# Patient Record
Sex: FEMALE | Race: WHITE | NOT HISPANIC OR LATINO
[De-identification: names, ages, dates, MRNs, and addresses within clinical notes are randomized per-mention and may not be internally consistent; named-entity substitution may affect disease eponyms.]

---

## 2021-03-16 PROBLEM — Z00.00 ENCOUNTER FOR PREVENTIVE HEALTH EXAMINATION: Status: ACTIVE | Noted: 2021-03-16

## 2021-04-08 ENCOUNTER — APPOINTMENT (OUTPATIENT)
Dept: OBGYN | Facility: CLINIC | Age: 31
End: 2021-04-08

## 2021-10-24 ENCOUNTER — TRANSCRIPTION ENCOUNTER (OUTPATIENT)
Age: 31
End: 2021-10-24

## 2021-12-09 ENCOUNTER — ASOB RESULT (OUTPATIENT)
Age: 31
End: 2021-12-09

## 2021-12-09 ENCOUNTER — APPOINTMENT (OUTPATIENT)
Dept: ANTEPARTUM | Facility: CLINIC | Age: 31
End: 2021-12-09
Payer: COMMERCIAL

## 2021-12-09 ENCOUNTER — TRANSCRIPTION ENCOUNTER (OUTPATIENT)
Age: 31
End: 2021-12-09

## 2021-12-09 PROCEDURE — 76813 OB US NUCHAL MEAS 1 GEST: CPT

## 2021-12-09 PROCEDURE — 76801 OB US < 14 WKS SINGLE FETUS: CPT

## 2021-12-09 PROCEDURE — 36416 COLLJ CAPILLARY BLOOD SPEC: CPT

## 2021-12-28 ENCOUNTER — TRANSCRIPTION ENCOUNTER (OUTPATIENT)
Age: 31
End: 2021-12-28

## 2022-02-03 ENCOUNTER — APPOINTMENT (OUTPATIENT)
Dept: ANTEPARTUM | Facility: CLINIC | Age: 32
End: 2022-02-03

## 2022-02-05 ENCOUNTER — APPOINTMENT (OUTPATIENT)
Dept: ANTEPARTUM | Facility: CLINIC | Age: 32
End: 2022-02-05
Payer: COMMERCIAL

## 2022-02-05 ENCOUNTER — ASOB RESULT (OUTPATIENT)
Age: 32
End: 2022-02-05

## 2022-02-05 PROCEDURE — 76811 OB US DETAILED SNGL FETUS: CPT

## 2022-05-31 ENCOUNTER — INPATIENT (INPATIENT)
Facility: HOSPITAL | Age: 32
LOS: 5 days | Discharge: HOME | End: 2022-06-06
Attending: OBSTETRICS & GYNECOLOGY | Admitting: OBSTETRICS & GYNECOLOGY
Payer: COMMERCIAL

## 2022-05-31 VITALS
DIASTOLIC BLOOD PRESSURE: 80 MMHG | SYSTOLIC BLOOD PRESSURE: 137 MMHG | HEART RATE: 115 BPM | RESPIRATION RATE: 20 BRPM | TEMPERATURE: 99 F

## 2022-05-31 DIAGNOSIS — O10.919 UNSPECIFIED PRE-EXISTING HYPERTENSION COMPLICATING PREGNANCY, UNSPECIFIED TRIMESTER: ICD-10-CM

## 2022-05-31 DIAGNOSIS — Z3A.00 WEEKS OF GESTATION OF PREGNANCY NOT SPECIFIED: ICD-10-CM

## 2022-05-31 DIAGNOSIS — O26.899 OTHER SPECIFIED PREGNANCY RELATED CONDITIONS, UNSPECIFIED TRIMESTER: ICD-10-CM

## 2022-05-31 DIAGNOSIS — Z90.89 ACQUIRED ABSENCE OF OTHER ORGANS: Chronic | ICD-10-CM

## 2022-05-31 LAB
ALBUMIN SERPL ELPH-MCNC: 3.5 G/DL — SIGNIFICANT CHANGE UP (ref 3.3–5)
ALP SERPL-CCNC: 120 U/L — SIGNIFICANT CHANGE UP (ref 40–120)
ALT FLD-CCNC: 26 U/L — SIGNIFICANT CHANGE UP (ref 4–33)
ANION GAP SERPL CALC-SCNC: 15 MMOL/L — HIGH (ref 7–14)
APPEARANCE UR: CLEAR — SIGNIFICANT CHANGE UP
APTT BLD: 28.2 SEC — SIGNIFICANT CHANGE UP (ref 27–36.3)
AST SERPL-CCNC: 31 U/L — SIGNIFICANT CHANGE UP (ref 4–32)
BACTERIA # UR AUTO: ABNORMAL
BASOPHILS # BLD AUTO: 0.05 K/UL — SIGNIFICANT CHANGE UP (ref 0–0.2)
BASOPHILS NFR BLD AUTO: 0.3 % — SIGNIFICANT CHANGE UP (ref 0–2)
BILIRUB SERPL-MCNC: 0.2 MG/DL — SIGNIFICANT CHANGE UP (ref 0.2–1.2)
BILIRUB UR-MCNC: NEGATIVE — SIGNIFICANT CHANGE UP
BLD GP AB SCN SERPL QL: POSITIVE — SIGNIFICANT CHANGE UP
BUN SERPL-MCNC: 9 MG/DL — SIGNIFICANT CHANGE UP (ref 7–23)
CALCIUM SERPL-MCNC: 9.6 MG/DL — SIGNIFICANT CHANGE UP (ref 8.4–10.5)
CHLORIDE SERPL-SCNC: 102 MMOL/L — SIGNIFICANT CHANGE UP (ref 98–107)
CO2 SERPL-SCNC: 19 MMOL/L — LOW (ref 22–31)
COLOR SPEC: YELLOW — SIGNIFICANT CHANGE UP
CREAT ?TM UR-MCNC: 161 MG/DL — SIGNIFICANT CHANGE UP
CREAT SERPL-MCNC: 0.57 MG/DL — SIGNIFICANT CHANGE UP (ref 0.5–1.3)
DIFF PNL FLD: NEGATIVE — SIGNIFICANT CHANGE UP
EGFR: 125 ML/MIN/1.73M2 — SIGNIFICANT CHANGE UP
EOSINOPHIL # BLD AUTO: 0.07 K/UL — SIGNIFICANT CHANGE UP (ref 0–0.5)
EOSINOPHIL NFR BLD AUTO: 0.5 % — SIGNIFICANT CHANGE UP (ref 0–6)
EPI CELLS # UR: 3 /HPF — SIGNIFICANT CHANGE UP (ref 0–5)
FIBRINOGEN PPP-MCNC: 783 MG/DL — HIGH (ref 330–520)
GLUCOSE SERPL-MCNC: 80 MG/DL — SIGNIFICANT CHANGE UP (ref 70–99)
GLUCOSE UR QL: NEGATIVE — SIGNIFICANT CHANGE UP
HCT VFR BLD CALC: 37.9 % — SIGNIFICANT CHANGE UP (ref 34.5–45)
HGB BLD-MCNC: 12.7 G/DL — SIGNIFICANT CHANGE UP (ref 11.5–15.5)
HYALINE CASTS # UR AUTO: 0 /LPF — SIGNIFICANT CHANGE UP (ref 0–7)
IANC: 10.69 K/UL — HIGH (ref 1.8–7.4)
IMM GRANULOCYTES NFR BLD AUTO: 0.5 % — SIGNIFICANT CHANGE UP (ref 0–1.5)
INR BLD: 0.94 RATIO — SIGNIFICANT CHANGE UP (ref 0.88–1.16)
KETONES UR-MCNC: ABNORMAL
LDH SERPL L TO P-CCNC: 211 U/L — SIGNIFICANT CHANGE UP (ref 135–225)
LEUKOCYTE ESTERASE UR-ACNC: NEGATIVE — SIGNIFICANT CHANGE UP
LYMPHOCYTES # BLD AUTO: 20.7 % — SIGNIFICANT CHANGE UP (ref 13–44)
LYMPHOCYTES # BLD AUTO: 3.11 K/UL — SIGNIFICANT CHANGE UP (ref 1–3.3)
MCHC RBC-ENTMCNC: 30.2 PG — SIGNIFICANT CHANGE UP (ref 27–34)
MCHC RBC-ENTMCNC: 33.5 GM/DL — SIGNIFICANT CHANGE UP (ref 32–36)
MCV RBC AUTO: 90 FL — SIGNIFICANT CHANGE UP (ref 80–100)
MONOCYTES # BLD AUTO: 1.03 K/UL — HIGH (ref 0–0.9)
MONOCYTES NFR BLD AUTO: 6.9 % — SIGNIFICANT CHANGE UP (ref 2–14)
NEUTROPHILS # BLD AUTO: 10.69 K/UL — HIGH (ref 1.8–7.4)
NEUTROPHILS NFR BLD AUTO: 71.1 % — SIGNIFICANT CHANGE UP (ref 43–77)
NITRITE UR-MCNC: NEGATIVE — SIGNIFICANT CHANGE UP
NRBC # BLD: 0 /100 WBCS — SIGNIFICANT CHANGE UP
NRBC # FLD: 0 K/UL — SIGNIFICANT CHANGE UP
PH UR: 6.5 — SIGNIFICANT CHANGE UP (ref 5–8)
PLATELET # BLD AUTO: 280 K/UL — SIGNIFICANT CHANGE UP (ref 150–400)
POTASSIUM SERPL-MCNC: 4.3 MMOL/L — SIGNIFICANT CHANGE UP (ref 3.5–5.3)
POTASSIUM SERPL-SCNC: 4.3 MMOL/L — SIGNIFICANT CHANGE UP (ref 3.5–5.3)
PROT ?TM UR-MCNC: 28 MG/DL — SIGNIFICANT CHANGE UP
PROT SERPL-MCNC: 6.7 G/DL — SIGNIFICANT CHANGE UP (ref 6–8.3)
PROT UR-MCNC: ABNORMAL
PROT/CREAT UR-RTO: 0.2 RATIO — SIGNIFICANT CHANGE UP (ref 0–0.2)
PROTHROM AB SERPL-ACNC: 10.9 SEC — SIGNIFICANT CHANGE UP (ref 10.5–13.4)
RBC # BLD: 4.21 M/UL — SIGNIFICANT CHANGE UP (ref 3.8–5.2)
RBC # FLD: 13.2 % — SIGNIFICANT CHANGE UP (ref 10.3–14.5)
RBC CASTS # UR COMP ASSIST: 4 /HPF — SIGNIFICANT CHANGE UP (ref 0–4)
RH IG SCN BLD-IMP: NEGATIVE — SIGNIFICANT CHANGE UP
RH IG SCN BLD-IMP: NEGATIVE — SIGNIFICANT CHANGE UP
SODIUM SERPL-SCNC: 136 MMOL/L — SIGNIFICANT CHANGE UP (ref 135–145)
SP GR SPEC: 1.02 — SIGNIFICANT CHANGE UP (ref 1–1.05)
URATE SERPL-MCNC: 5.7 MG/DL — SIGNIFICANT CHANGE UP (ref 2.5–7)
UROBILINOGEN FLD QL: SIGNIFICANT CHANGE UP
WBC # BLD: 15.03 K/UL — HIGH (ref 3.8–10.5)
WBC # FLD AUTO: 15.03 K/UL — HIGH (ref 3.8–10.5)
WBC UR QL: 4 /HPF — SIGNIFICANT CHANGE UP (ref 0–5)

## 2022-05-31 PROCEDURE — 86077 PHYS BLOOD BANK SERV XMATCH: CPT

## 2022-05-31 RX ORDER — SODIUM CHLORIDE 9 MG/ML
1000 INJECTION, SOLUTION INTRAVENOUS
Refills: 0 | Status: DISCONTINUED | OUTPATIENT
Start: 2022-05-31 | End: 2022-06-04

## 2022-05-31 RX ORDER — SODIUM CHLORIDE 9 MG/ML
1000 INJECTION, SOLUTION INTRAVENOUS ONCE
Refills: 0 | Status: COMPLETED | OUTPATIENT
Start: 2022-05-31 | End: 2022-05-31

## 2022-05-31 RX ORDER — OXYTOCIN 10 UNIT/ML
333.33 VIAL (ML) INJECTION
Qty: 20 | Refills: 0 | Status: COMPLETED | OUTPATIENT
Start: 2022-05-31 | End: 2022-06-01

## 2022-05-31 RX ORDER — SODIUM CHLORIDE 9 MG/ML
1000 INJECTION, SOLUTION INTRAVENOUS
Refills: 0 | Status: DISCONTINUED | OUTPATIENT
Start: 2022-05-31 | End: 2022-05-31

## 2022-05-31 RX ORDER — OXYTOCIN 10 UNIT/ML
333.33 VIAL (ML) INJECTION
Qty: 20 | Refills: 0 | Status: DISCONTINUED | OUTPATIENT
Start: 2022-05-31 | End: 2022-05-31

## 2022-05-31 RX ADMIN — SODIUM CHLORIDE 1000 MILLILITER(S): 9 INJECTION, SOLUTION INTRAVENOUS at 21:55

## 2022-05-31 RX ADMIN — SODIUM CHLORIDE 125 MILLILITER(S): 9 INJECTION, SOLUTION INTRAVENOUS at 21:50

## 2022-05-31 NOTE — OB RN TRIAGE NOTE - NSICDXPASTMEDICALHX_GEN_ALL_CORE_FT
PAST MEDICAL HISTORY:  No pertinent past medical history PAST MEDICAL HISTORY:  2019 novel coronavirus disease (COVID-19) 5/12/2022

## 2022-05-31 NOTE — OB PROVIDER H&P - NS_OBGYNHISTORY_OBGYN_ALL_OB_FT
AP course complicated by labile bp, rH negative received rhogam at 28 weeks   OB: SAB x1 @9 wk 2021  GYN: denies    GBS negative 5/7

## 2022-05-31 NOTE — OB PROVIDER H&P - NSHPPHYSICALEXAM_GEN_ALL_CORE
Vital Signs Last 24 Hrs  T(C): 37.2 (31 May 2022 18:07), Max: 37.2 (31 May 2022 18:07)  T(F): 99 (31 May 2022 18:07), Max: 99 (31 May 2022 18:07)  HR: 106 (31 May 2022 20:48) (105 - 115)  BP: 124/73 (31 May 2022 20:48) (124/73 - 137/80)  BP(mean): --  RR: 20 (31 May 2022 18:07) (20 - 20)  SpO2: --.    Assessment reveals VSS  Abdomen soft, NT, gravid  Cat 1 tracing, ctx every 4 mins  Transabdominal Ultrasound- cephalic  Vaginal Exam- 0/long/-3   A&Ox3  Lungs- clear bilateral  Heart- normal rate and rhythm

## 2022-05-31 NOTE — OB RN TRIAGE NOTE - FALL HARM RISK - UNIVERSAL INTERVENTIONS
Bed in lowest position, wheels locked, appropriate side rails in place/Call bell, personal items and telephone in reach/Instruct patient to call for assistance before getting out of bed or chair/Non-slip footwear when patient is out of bed/[place] to call system/Physically safe environment - no spills, clutter or unnecessary equipment/Purposeful Proactive Rounding/Room/bathroom lighting operational, light cord in reach

## 2022-05-31 NOTE — OB PROVIDER TRIAGE NOTE - NSOBPROVIDERNOTE_OBGYN_ALL_OB_FT
HEL labs sent results pend.   NST in progress   will continue to monitor HEL labs sent results pend.   NST in progress   will continue to monitor    Labs reviewed and plan D/w Dr. Dubon  patient to be induced for gHTN

## 2022-05-31 NOTE — OB RN PATIENT PROFILE - FALL HARM RISK - UNIVERSAL INTERVENTIONS
Bed in lowest position, wheels locked, appropriate side rails in place/Call bell, personal items and telephone in reach/Instruct patient to call for assistance before getting out of bed or chair/Non-slip footwear when patient is out of bed/[place] to call system/Physically safe environment - no spills, clutter or unnecessary equipment/Purposeful Proactive Rounding/Room/bathroom lighting operational, light cord in reach

## 2022-05-31 NOTE — OB PROVIDER TRIAGE NOTE - NS_OBGYNHISTORY_OBGYN_ALL_OB_FT
AP course complicated by labile bp   OB: SAB x1 @9 wk 2021  GYN: loriies AP course complicated by labile bp   OB: SAB x1 @9 wk 2021  GYN: denies    GBS negative

## 2022-05-31 NOTE — OB RN PATIENT PROFILE - NSICDXNOPASTMEDICALHX_GEN_ALL_CORE
<-- Click to add NO pertinent Past Medical History Complex Repair And Graft Additional Text (Will Appearing After The Standard Complex Repair Text): The complex repair was not sufficient to completely close the primary defect. The remaining additional defect was repaired with the graft mentioned below.

## 2022-05-31 NOTE — OB PROVIDER H&P - PROBLEM SELECTOR PLAN 1
plan d/w Dr. Dubon  patient to be admitted for IOL for gestational hypertension   for po cytotec   epi prn   routine orders   covid pcr not done due to patient being positive 3 weeks ago   for IV bolus 1000 mL of LR   approved for one regular diet prior to starting IOL.  consents signed by patient

## 2022-05-31 NOTE — OB PROVIDER TRIAGE NOTE - HISTORY OF PRESENT ILLNESS
32 year old  at 37 weeks presents with elevated BP in office. Patient states she had a 24 hour urine sent and HELLP labs sent last monday and . Denies any headaches, blurry vision or epigastric pain. Denies contractions or cramping, denies LOF, stateS +FM.   PMH: denies   PSH: Tonsillectomy   Hx: Anxiety- takes Propanalol 5mg PRN   Denies use of etoh, drugs, tobacco

## 2022-05-31 NOTE — OB PROVIDER TRIAGE NOTE - NSHPPHYSICALEXAM_GEN_ALL_CORE
Vital Signs Last 24 Hrs  T(C): 37.2 (31 May 2022 18:07), Max: 37.2 (31 May 2022 18:07)  T(F): 99 (31 May 2022 18:07), Max: 99 (31 May 2022 18:07)  HR: 106 (31 May 2022 20:48) (105 - 115)  BP: 124/73 (31 May 2022 20:48) (124/73 - 137/80)  BP(mean): --  RR: 20 (31 May 2022 18:07) (20 - 20)  SpO2: --.    Assessment reveals VSS  Abdomen soft, NT, gravid  Cat 1 tracing, ctx every 4 mins  Transabdominal Ultrasound-   Vaginal Exam-   A&Ox3  Lungs- clear bilateral  Heart- normal rate and rhythm Vital Signs Last 24 Hrs  T(C): 37.2 (31 May 2022 18:07), Max: 37.2 (31 May 2022 18:07)  T(F): 99 (31 May 2022 18:07), Max: 99 (31 May 2022 18:07)  HR: 106 (31 May 2022 20:48) (105 - 115)  BP: 124/73 (31 May 2022 20:48) (124/73 - 137/80)  BP(mean): --  RR: 20 (31 May 2022 18:07) (20 - 20)  SpO2: --.    Assessment reveals VSS  Abdomen soft, NT, gravid  Cat 1 tracing, ctx every 4 mins  Transabdominal Ultrasound- cephalic  Vaginal Exam- 0/long/-3   A&Ox3  Lungs- clear bilateral  Heart- normal rate and rhythm

## 2022-06-01 DIAGNOSIS — O16.9 UNSPECIFIED MATERNAL HYPERTENSION, UNSPECIFIED TRIMESTER: ICD-10-CM

## 2022-06-01 LAB
COVID-19 SPIKE DOMAIN AB INTERP: POSITIVE
COVID-19 SPIKE DOMAIN ANTIBODY RESULT: >250 U/ML — HIGH
SARS-COV-2 IGG+IGM SERPL QL IA: >250 U/ML — HIGH
SARS-COV-2 IGG+IGM SERPL QL IA: POSITIVE
T PALLIDUM AB TITR SER: NEGATIVE — SIGNIFICANT CHANGE UP

## 2022-06-01 RX ADMIN — SODIUM CHLORIDE 125 MILLILITER(S): 9 INJECTION, SOLUTION INTRAVENOUS at 22:45

## 2022-06-02 LAB
ALBUMIN SERPL ELPH-MCNC: 3.6 G/DL — SIGNIFICANT CHANGE UP (ref 3.3–5)
ALP SERPL-CCNC: 123 U/L — HIGH (ref 40–120)
ALT FLD-CCNC: 22 U/L — SIGNIFICANT CHANGE UP (ref 4–33)
ANION GAP SERPL CALC-SCNC: 13 MMOL/L — SIGNIFICANT CHANGE UP (ref 7–14)
APTT BLD: 28.2 SEC — SIGNIFICANT CHANGE UP (ref 27–36.3)
AST SERPL-CCNC: 23 U/L — SIGNIFICANT CHANGE UP (ref 4–32)
BASOPHILS # BLD AUTO: 0.03 K/UL — SIGNIFICANT CHANGE UP (ref 0–0.2)
BASOPHILS NFR BLD AUTO: 0.2 % — SIGNIFICANT CHANGE UP (ref 0–2)
BILIRUB SERPL-MCNC: 0.3 MG/DL — SIGNIFICANT CHANGE UP (ref 0.2–1.2)
BUN SERPL-MCNC: 7 MG/DL — SIGNIFICANT CHANGE UP (ref 7–23)
CALCIUM SERPL-MCNC: 9.5 MG/DL — SIGNIFICANT CHANGE UP (ref 8.4–10.5)
CHLORIDE SERPL-SCNC: 101 MMOL/L — SIGNIFICANT CHANGE UP (ref 98–107)
CO2 SERPL-SCNC: 21 MMOL/L — LOW (ref 22–31)
CREAT SERPL-MCNC: 0.58 MG/DL — SIGNIFICANT CHANGE UP (ref 0.5–1.3)
EGFR: 124 ML/MIN/1.73M2 — SIGNIFICANT CHANGE UP
EOSINOPHIL # BLD AUTO: 0.04 K/UL — SIGNIFICANT CHANGE UP (ref 0–0.5)
EOSINOPHIL NFR BLD AUTO: 0.3 % — SIGNIFICANT CHANGE UP (ref 0–6)
FIBRINOGEN PPP-MCNC: 846 MG/DL — HIGH (ref 330–520)
GLUCOSE SERPL-MCNC: 81 MG/DL — SIGNIFICANT CHANGE UP (ref 70–99)
HCT VFR BLD CALC: 37.1 % — SIGNIFICANT CHANGE UP (ref 34.5–45)
HGB BLD-MCNC: 12.4 G/DL — SIGNIFICANT CHANGE UP (ref 11.5–15.5)
IANC: 9.29 K/UL — HIGH (ref 1.8–7.4)
IMM GRANULOCYTES NFR BLD AUTO: 0.5 % — SIGNIFICANT CHANGE UP (ref 0–1.5)
INR BLD: 0.94 RATIO — SIGNIFICANT CHANGE UP (ref 0.88–1.16)
LDH SERPL L TO P-CCNC: 178 U/L — SIGNIFICANT CHANGE UP (ref 135–225)
LYMPHOCYTES # BLD AUTO: 1.88 K/UL — SIGNIFICANT CHANGE UP (ref 1–3.3)
LYMPHOCYTES # BLD AUTO: 15.5 % — SIGNIFICANT CHANGE UP (ref 13–44)
MCHC RBC-ENTMCNC: 30.1 PG — SIGNIFICANT CHANGE UP (ref 27–34)
MCHC RBC-ENTMCNC: 33.4 GM/DL — SIGNIFICANT CHANGE UP (ref 32–36)
MCV RBC AUTO: 90 FL — SIGNIFICANT CHANGE UP (ref 80–100)
MONOCYTES # BLD AUTO: 0.81 K/UL — SIGNIFICANT CHANGE UP (ref 0–0.9)
MONOCYTES NFR BLD AUTO: 6.7 % — SIGNIFICANT CHANGE UP (ref 2–14)
NEUTROPHILS # BLD AUTO: 9.29 K/UL — HIGH (ref 1.8–7.4)
NEUTROPHILS NFR BLD AUTO: 76.8 % — SIGNIFICANT CHANGE UP (ref 43–77)
NRBC # BLD: 0 /100 WBCS — SIGNIFICANT CHANGE UP
NRBC # FLD: 0 K/UL — SIGNIFICANT CHANGE UP
PLATELET # BLD AUTO: 285 K/UL — SIGNIFICANT CHANGE UP (ref 150–400)
POTASSIUM SERPL-MCNC: 4 MMOL/L — SIGNIFICANT CHANGE UP (ref 3.5–5.3)
POTASSIUM SERPL-SCNC: 4 MMOL/L — SIGNIFICANT CHANGE UP (ref 3.5–5.3)
PROT SERPL-MCNC: 6.7 G/DL — SIGNIFICANT CHANGE UP (ref 6–8.3)
PROTHROM AB SERPL-ACNC: 10.9 SEC — SIGNIFICANT CHANGE UP (ref 10.5–13.4)
RBC # BLD: 4.12 M/UL — SIGNIFICANT CHANGE UP (ref 3.8–5.2)
RBC # FLD: 13.4 % — SIGNIFICANT CHANGE UP (ref 10.3–14.5)
SODIUM SERPL-SCNC: 135 MMOL/L — SIGNIFICANT CHANGE UP (ref 135–145)
URATE SERPL-MCNC: 6.3 MG/DL — SIGNIFICANT CHANGE UP (ref 2.5–7)
WBC # BLD: 12.11 K/UL — HIGH (ref 3.8–10.5)
WBC # FLD AUTO: 12.11 K/UL — HIGH (ref 3.8–10.5)

## 2022-06-02 RX ORDER — BUTORPHANOL TARTRATE 2 MG/ML
2 INJECTION, SOLUTION INTRAMUSCULAR; INTRAVENOUS ONCE
Refills: 0 | Status: DISCONTINUED | OUTPATIENT
Start: 2022-06-02 | End: 2022-06-02

## 2022-06-02 RX ADMIN — BUTORPHANOL TARTRATE 2 MILLIGRAM(S): 2 INJECTION, SOLUTION INTRAMUSCULAR; INTRAVENOUS at 14:54

## 2022-06-02 RX ADMIN — SODIUM CHLORIDE 125 MILLILITER(S): 9 INJECTION, SOLUTION INTRAVENOUS at 07:30

## 2022-06-03 ENCOUNTER — TRANSCRIPTION ENCOUNTER (OUTPATIENT)
Age: 32
End: 2022-06-03

## 2022-06-03 RX ORDER — OXYTOCIN 10 UNIT/ML
VIAL (ML) INJECTION
Qty: 30 | Refills: 0 | Status: DISCONTINUED | OUTPATIENT
Start: 2022-06-03 | End: 2022-06-04

## 2022-06-03 RX ADMIN — Medication 2 MILLIUNIT(S)/MIN: at 08:26

## 2022-06-03 RX ADMIN — SODIUM CHLORIDE 125 MILLILITER(S): 9 INJECTION, SOLUTION INTRAVENOUS at 01:26

## 2022-06-03 NOTE — CHART NOTE - NSCHARTNOTEFT_GEN_A_CORE
late entry note   pt seen and examined for 2 min decel   VE: 9/100/-2   fht recovered after repositioning   ctx q 2-3 min on 20 mu pitocin   will hold pitocin if decel recurs
PA note     seen & examined for cont of management  comfortable denies pain    VS  T(C): 37.0 (06-01-22 @ 10:36)  HR: 101 (06-01-22 @ 10:36)  BP: 132/72 (06-01-22 @ 10:36)  RR: 18 (05-31-22 @ 22:39)  SpO2: --      140/mod luz elena/+accels/no decels  [place] irreg  0/50/-3 posterior    cervical balloon not attempted at this time   cont PO cytotec  BPs stable  ldevmarisol jhaveri
patient seen and evaluated at bedside  briefly, she is a P0 @ 37+ weeks IOL for GHTN  patient very comfortable, sitting up in bed chatting with   reports mild cramping intermittently since beginning misoprostol, currently receiving 60mcg  FHT Cat I w/ accels, [place] w/ irritability 2/10   defer vaginal exam at this time as patient is early in IOL process  BPs stable 120s/80s, HELLP labs sent earlier today WNL  repeat HELLP labs if BP becomes elevated > 140s/90s persistently or if patient develops symptoms of PEC  she currently denies HA, VC, RUQ/epigastric pain  plan of care to switch to vaginal cytotec once PO course is complete  reviewed plan of care with patient, partner and RN, all questions answered in detail  encouraged rest while patient is comfortable

## 2022-06-04 ENCOUNTER — TRANSCRIPTION ENCOUNTER (OUTPATIENT)
Age: 32
End: 2022-06-04

## 2022-06-04 LAB
BLD GP AB SCN SERPL QL: POSITIVE — SIGNIFICANT CHANGE UP
HCT VFR BLD CALC: 34.4 % — LOW (ref 34.5–45)
HGB BLD-MCNC: 11.1 G/DL — LOW (ref 11.5–15.5)
MCHC RBC-ENTMCNC: 29.6 PG — SIGNIFICANT CHANGE UP (ref 27–34)
MCHC RBC-ENTMCNC: 32.3 GM/DL — SIGNIFICANT CHANGE UP (ref 32–36)
MCV RBC AUTO: 91.7 FL — SIGNIFICANT CHANGE UP (ref 80–100)
NRBC # BLD: 0 /100 WBCS — SIGNIFICANT CHANGE UP
NRBC # FLD: 0 K/UL — SIGNIFICANT CHANGE UP
PLATELET # BLD AUTO: 261 K/UL — SIGNIFICANT CHANGE UP (ref 150–400)
RBC # BLD: 3.75 M/UL — LOW (ref 3.8–5.2)
RBC # FLD: 13.2 % — SIGNIFICANT CHANGE UP (ref 10.3–14.5)
RH IG SCN BLD-IMP: NEGATIVE — SIGNIFICANT CHANGE UP
WBC # BLD: 21.92 K/UL — HIGH (ref 3.8–10.5)
WBC # FLD AUTO: 21.92 K/UL — HIGH (ref 3.8–10.5)

## 2022-06-04 PROCEDURE — 86077 PHYS BLOOD BANK SERV XMATCH: CPT

## 2022-06-04 PROCEDURE — 93010 ELECTROCARDIOGRAM REPORT: CPT

## 2022-06-04 DEVICE — SURGICEL FIBRILLAR 1 X 2": Type: IMPLANTABLE DEVICE | Status: FUNCTIONAL

## 2022-06-04 RX ORDER — OXYCODONE HYDROCHLORIDE 5 MG/1
5 TABLET ORAL ONCE
Refills: 0 | Status: DISCONTINUED | OUTPATIENT
Start: 2022-06-04 | End: 2022-06-06

## 2022-06-04 RX ORDER — TETANUS TOXOID, REDUCED DIPHTHERIA TOXOID AND ACELLULAR PERTUSSIS VACCINE, ADSORBED 5; 2.5; 8; 8; 2.5 [IU]/.5ML; [IU]/.5ML; UG/.5ML; UG/.5ML; UG/.5ML
0.5 SUSPENSION INTRAMUSCULAR ONCE
Refills: 0 | Status: DISCONTINUED | OUTPATIENT
Start: 2022-06-04 | End: 2022-06-06

## 2022-06-04 RX ORDER — SODIUM CHLORIDE 9 MG/ML
500 INJECTION, SOLUTION INTRAVENOUS ONCE
Refills: 0 | Status: COMPLETED | OUTPATIENT
Start: 2022-06-04 | End: 2022-06-04

## 2022-06-04 RX ORDER — SODIUM CHLORIDE 9 MG/ML
1000 INJECTION, SOLUTION INTRAVENOUS
Refills: 0 | Status: DISCONTINUED | OUTPATIENT
Start: 2022-06-04 | End: 2022-06-05

## 2022-06-04 RX ORDER — MAGNESIUM HYDROXIDE 400 MG/1
30 TABLET, CHEWABLE ORAL
Refills: 0 | Status: DISCONTINUED | OUTPATIENT
Start: 2022-06-04 | End: 2022-06-06

## 2022-06-04 RX ORDER — ACETAMINOPHEN 500 MG
975 TABLET ORAL
Refills: 0 | Status: DISCONTINUED | OUTPATIENT
Start: 2022-06-04 | End: 2022-06-06

## 2022-06-04 RX ORDER — OXYTOCIN 10 UNIT/ML
333.33 VIAL (ML) INJECTION
Qty: 20 | Refills: 0 | Status: DISCONTINUED | OUTPATIENT
Start: 2022-06-04 | End: 2022-06-05

## 2022-06-04 RX ORDER — ACETAMINOPHEN 500 MG
3 TABLET ORAL
Qty: 0 | Refills: 0 | DISCHARGE
Start: 2022-06-04

## 2022-06-04 RX ORDER — KETOROLAC TROMETHAMINE 30 MG/ML
30 SYRINGE (ML) INJECTION EVERY 6 HOURS
Refills: 0 | Status: DISCONTINUED | OUTPATIENT
Start: 2022-06-04 | End: 2022-06-05

## 2022-06-04 RX ORDER — ASPIRIN/CALCIUM CARB/MAGNESIUM 324 MG
1 TABLET ORAL
Qty: 0 | Refills: 0 | DISCHARGE

## 2022-06-04 RX ORDER — DIPHENHYDRAMINE HCL 50 MG
25 CAPSULE ORAL EVERY 6 HOURS
Refills: 0 | Status: DISCONTINUED | OUTPATIENT
Start: 2022-06-04 | End: 2022-06-06

## 2022-06-04 RX ORDER — OXYCODONE HYDROCHLORIDE 5 MG/1
5 TABLET ORAL
Refills: 0 | Status: DISCONTINUED | OUTPATIENT
Start: 2022-06-04 | End: 2022-06-06

## 2022-06-04 RX ORDER — SIMETHICONE 80 MG/1
80 TABLET, CHEWABLE ORAL EVERY 4 HOURS
Refills: 0 | Status: DISCONTINUED | OUTPATIENT
Start: 2022-06-04 | End: 2022-06-06

## 2022-06-04 RX ORDER — SODIUM CHLORIDE 9 MG/ML
500 INJECTION, SOLUTION INTRAVENOUS ONCE
Refills: 0 | Status: DISCONTINUED | OUTPATIENT
Start: 2022-06-04 | End: 2022-06-05

## 2022-06-04 RX ORDER — IBUPROFEN 200 MG
1 TABLET ORAL
Qty: 0 | Refills: 0 | DISCHARGE
Start: 2022-06-04

## 2022-06-04 RX ORDER — ERGOCALCIFEROL 1.25 MG/1
1 CAPSULE ORAL
Qty: 0 | Refills: 0 | DISCHARGE

## 2022-06-04 RX ORDER — IBUPROFEN 200 MG
600 TABLET ORAL EVERY 6 HOURS
Refills: 0 | Status: COMPLETED | OUTPATIENT
Start: 2022-06-04 | End: 2023-05-03

## 2022-06-04 RX ORDER — LANOLIN
1 OINTMENT (GRAM) TOPICAL EVERY 6 HOURS
Refills: 0 | Status: DISCONTINUED | OUTPATIENT
Start: 2022-06-04 | End: 2022-06-06

## 2022-06-04 RX ORDER — HEPARIN SODIUM 5000 [USP'U]/ML
10000 INJECTION INTRAVENOUS; SUBCUTANEOUS EVERY 12 HOURS
Refills: 0 | Status: DISCONTINUED | OUTPATIENT
Start: 2022-06-04 | End: 2022-06-06

## 2022-06-04 RX ADMIN — Medication 1000 MILLIUNIT(S)/MIN: at 06:33

## 2022-06-04 RX ADMIN — SODIUM CHLORIDE 75 MILLILITER(S): 9 INJECTION, SOLUTION INTRAVENOUS at 07:44

## 2022-06-04 RX ADMIN — Medication 975 MILLIGRAM(S): at 11:25

## 2022-06-04 RX ADMIN — Medication 1000 MILLIUNIT(S)/MIN: at 07:45

## 2022-06-04 RX ADMIN — Medication 30 MILLIGRAM(S): at 14:06

## 2022-06-04 RX ADMIN — SODIUM CHLORIDE 1000 MILLILITER(S): 9 INJECTION, SOLUTION INTRAVENOUS at 11:25

## 2022-06-04 RX ADMIN — Medication 30 MILLIGRAM(S): at 21:32

## 2022-06-04 RX ADMIN — Medication 30 MILLIGRAM(S): at 07:44

## 2022-06-04 RX ADMIN — Medication 30 MILLIGRAM(S): at 08:20

## 2022-06-04 RX ADMIN — Medication 30 MILLIGRAM(S): at 22:02

## 2022-06-04 RX ADMIN — Medication 975 MILLIGRAM(S): at 17:35

## 2022-06-04 RX ADMIN — Medication 975 MILLIGRAM(S): at 11:38

## 2022-06-04 RX ADMIN — SIMETHICONE 80 MILLIGRAM(S): 80 TABLET, CHEWABLE ORAL at 17:35

## 2022-06-04 RX ADMIN — SODIUM CHLORIDE 75 MILLILITER(S): 9 INJECTION, SOLUTION INTRAVENOUS at 06:33

## 2022-06-04 RX ADMIN — HEPARIN SODIUM 10000 UNIT(S): 5000 INJECTION INTRAVENOUS; SUBCUTANEOUS at 11:31

## 2022-06-04 NOTE — OB PROVIDER LABOR PROGRESS NOTE - NSVAGINALEXAM_OBGYN_ALL_OB_DT
02-Jun-2022 15:30
02-Jun-2022 04:32
02-Jun-2022 22:31
03-Jun-2022 07:55
03-Jun-2022 03:27
03-Jun-2022 21:16
02-Jun-2022 10:53
03-Jun-2022 00:20
03-Jun-2022 12:00
03-Jun-2022 16:33
01-Jun-2022 15:15
02-Jun-2022 07:52
04-Jun-2022 00:14

## 2022-06-04 NOTE — OB PROVIDER LABOR PROGRESS NOTE - NS_OBIHICONTRACTIONPATTERNDETAILS_OBGYN_ALL_OB_FT
4-5 min
Difficult to trace
q4 min
q2 min
q2-3 min
absent
q4 min
q4min
q5-7 min
q4-7 min
Irregular
q3 min
q3 min
q2min

## 2022-06-04 NOTE — OB PROVIDER DELIVERY SUMMARY - NSSELHIDDEN_OBGYN_ALL_OB_FT
[NS_DeliveryAttending1_OBGYN_ALL_OB_FT:[id number]],[NS_DeliveryAssist1_OBGYN_ALL_OB_FT:[id number]=],[NS_DeliveryRN_OBGYN_ALL_OB_FT:[id number]=]

## 2022-06-04 NOTE — OB PROVIDER LABOR PROGRESS NOTE - NS_OBIHIFHRDETAILS_OBGYN_ALL_OB_FT
120s, moderate variability, accels, no decels
Cat 1
Cat 1: 130/mod variability/ + accels/ - decels
135/mod/(-)accels/(-)decels
Cat 1: 145/mod variability/ - accels/ - decels
120s, moderate variability, accels, no decels
120s, moderate variability, accels, no decels
Cat 1: 140/mod variability/ + accels/ - decels
130/mod/(+)accels/(-)decels
140s, moderate variability, accels, no decels
130s, moderate variability, no accels, no decels
Cat 1: 140/mod variability/ + accels/ - decels
140 mod luz elena +Accel -decel
150s, moderate variability, accels, no decels

## 2022-06-04 NOTE — OB RN DELIVERY SUMMARY - NS_SEPSISRSKCALC_OBGYN_ALL_OB_FT
EOS calculated successfully. EOS Risk Factor: 0.5/1000 live births ([facility] national incidence); GA=37w4d; Temp=99.14; ROM=16.2; GBS='Negative'; Antibiotics='No antibiotics or any antibiotics < 2 hrs prior to birth'

## 2022-06-04 NOTE — OB PROVIDER LABOR PROGRESS NOTE - NS_SUBJECTIVE/OBJECTIVE_OBGYN_ALL_OB_FT
PGY1 Labor & Delivery Progress Note     Pt examined @075 to evaluate for cervical change     T(C): 36.8 (06-02-22 @ 07:27), Max: 37.1 (06-01-22 @ 14:40)  HR: 90 (06-02-22 @ 07:46) (79 - 101)  BP: 132/73 (06-02-22 @ 07:46) (124/81 - 162/81)  RR: 18 (06-02-22 @ 07:27) (18 - 18)  SpO2: 98% (06-02-22 @ 07:27) (98% - 98%)
VE due to pt feeling increased rectal pressure
VE to evaluate progress in labor
Went to place the last dose of vaginal cytotec but on SVE the cervical balloon was sitting in the vagina.
At bedside to discuss plan of care with pt. Pt desires shower/break prior to continuing with induction.
Difficult to trace contractions. Patient AROM'ed and IUPC placed
PGY1 Labor & Delivery Progress Note     Pt examined @1633 due to increased discomfort     T(C): 36.9 (06-03-22 @ 16:15), Max: 37.1 (06-02-22 @ 22:28)  HR: 88 (06-03-22 @ 16:59) (73 - 114)  BP: 112/- (06-03-22 @ 16:58) (97/51 - 150/81)  RR: 17 (06-02-22 @ 18:15) (17 - 17)  SpO2: 97% (06-03-22 @ 16:59) (87% - 100%)
VC#3 placed at 11am. Patient is requesting an epidural before cervical balloon placement. Patient currently comfortable and not asking for epidural.
Patient examined for placement of Cervical balloon with 60 cc instilled in uterine and vaginal [place] respectively.
Patient s/p stadol however she did not tolerate CB attempt well. CB placement unsuccessful. Patient is considering epidural.
VE to place vaginal cytotec
VE to place vaginal cytotec
Patient examined for possible cervical balloon placement. Cervix is posterior and towards maternal left.
VE to place vaginal cytotec

## 2022-06-04 NOTE — OB RN DELIVERY SUMMARY - NSSELHIDDEN_OBGYN_ALL_OB_FT
[NS_DeliveryAttending1_OBGYN_ALL_OB_FT:[id number]],[NS_DeliveryAssist1_OBGYN_ALL_OB_FT:[id number]=],[NS_DeliveryRN_OBGYN_ALL_OB_FT:[id number]=]

## 2022-06-04 NOTE — OB RN DELIVERY SUMMARY - NS_DELIVERYNEONATOLOGIST1_OBGYN_ALL_OB_FT
Appointment given.   Bringing to the  by 1:00 pm. Copy to TC and abstracting. Called and spoke to mom and explained form .  Irma Herr MA/  For Teams Wilman and Janell     Con BAIRD

## 2022-06-04 NOTE — OB PROVIDER LABOR PROGRESS NOTE - NS_ADDCERVICALEXAM_OBGYN_ALL_OB
Click to add…

## 2022-06-04 NOTE — DISCHARGE NOTE OB - NS MD DC FALL RISK RISK
For information on Fall & Injury Prevention, visit: [email or web address] OR  [email or web address] OR  https://www.cdc.gov/steadi/patient.html

## 2022-06-04 NOTE — DISCHARGE NOTE OB - MEDICATION SUMMARY - MEDICATIONS TO STOP TAKING
I will STOP taking the medications listed below when I get home from the hospital:    Vitamin D2 50 mcg (2000 intl units) oral capsule  -- 1 cap(s) by mouth once a day    Aspir 81 oral delayed release tablet  -- 1 tab(s) by mouth once a day

## 2022-06-04 NOTE — OB RN INTRAOPERATIVE NOTE - NSSELHIDDEN_OBGYN_ALL_OB_FT
[NS_DeliveryAttending1_OBGYN_ALL_OB_FT:[id number]],[NS_DeliveryAssist1_OBGYN_ALL_OB_FT:[id number]=],[NS_DeliveryRN_OBGYN_ALL_OB_FT:[id number]=]

## 2022-06-04 NOTE — DISCHARGE NOTE OB - CARE PLAN
1 Principal Discharge DX:	 delivery delivered  Assessment and plan of treatment:	routine care   Principal Discharge DX:	 delivery delivered  Assessment and plan of treatment:	routine care  Secondary Diagnosis:	Anemia due to acute blood loss  Assessment and plan of treatment:	Cont Iron, PNV, iron rich foods postpartum

## 2022-06-04 NOTE — DISCHARGE NOTE OB - CARE PROVIDER_API CALL
Richar Carmichael)  Obstetrics and Gynecology  [address] [place] [address]  Phone: [phone]  Fax: [phone]  Follow Up Time:

## 2022-06-04 NOTE — OB PROVIDER DELIVERY SUMMARY - NSPROVIDERDELIVERYNOTE_OBGYN_ALL_OB_FT
primary LTCS, uncomplicated  viable female infant, vertex presentation, Apgars 7/8, cord gasses sent  Grossly normal fallopian tubes, uterus, and ovaries  Uterus closed in 1 layer  Fibrillar placed over hysterotomy     EBL:   IVF:   UOP: primary LTCS, uncomplicated  viable female infant, vertex presentation, Apgars 7/8, cord gasses sent  Grossly normal fallopian tubes, uterus, and ovaries  Uterus closed in 1 layer  Fibrillar placed over hysterotomy   Gent/Clinda/Azithromycin given     EBL: 606  IVF: 1500  UOP: 500

## 2022-06-04 NOTE — DISCHARGE NOTE OB - MEDICATION SUMMARY - MEDICATIONS TO TAKE
I will START or STAY ON the medications listed below when I get home from the hospital:    acetaminophen 325 mg oral tablet  -- 3 tab(s) by mouth every 6 hours, As Needed  -- Indication: For  delivery delivered    ibuprofen 600 mg oral tablet  -- 1 tab(s) by mouth every 6 hours, As Needed  -- Indication: For  delivery delivered    Prena1 oral capsule  -- 1 cap(s) by mouth once a day  -- Indication: For postpartum

## 2022-06-04 NOTE — DISCHARGE NOTE OB - MEDICATION SUMMARY - MEDICATIONS TO CHANGE
,DirectAddress_Unknown I will SWITCH the dose or number of times a day I take the medications listed below when I get home from the hospital:  None

## 2022-06-04 NOTE — DISCHARGE NOTE OB - PATIENT PORTAL LINK FT
You can access the FollowMyHealth Patient Portal offered by [facility] by registering at the following website: [email or web address]. By joining [employer]’s FollowMyHealth portal, you will also be able to view your health information using other applications (apps) compatible with our system.

## 2022-06-04 NOTE — OB NEONATOLOGY/PEDIATRICIAN DELIVERY SUMMARY - NSPEDSNEONOTESA_OBGYN_ALL_OB_FT
Peds called to delivery for cat II tracing and meconium stained fluids. 37.4wk female born via primary CS to a 30 y/o  mother.  Maternal history of anxiety and heart palpitations on propanolol. Prenatal history of gestational HTN. Maternal labs include Blood Type O- (Rhogam 28wk), HIV - , RPR NR , Rubella I , Hep B - , GBS - , COVID -. AROM at 1206 on 6/3 with clear then meconium fluids (ROM hours: 16). Baby emerged vigorous, crying, was w/d/s/s with APGARS of 7/8. Resuscitation included: CPAP 5L 30% at highest setting from 3MOL to 7.5MOL. Then deep suctioned x1. Successfully transitioned to room air. Mom plans to initiate breastfeeding and formula feed, consents Hep B vaccine.  Highest maternal temp: 37.3. EOS 0.37. Peds called to delivery for cat II tracing and meconium stained fluids. 37.4wk female born via primary CS to a 30 y/o  mother.  Maternal history of anxiety and heart palpitations on propanolol. Prenatal history of gestational HTN. Maternal labs include Blood Type O- (Rhogam 28wk), HIV - , RPR NR , Rubella I , Hep B - , GBS - , COVID -. AROM at 1206 on 6/3 with clear then meconium fluids (ROM hours: 16). Baby emerged vigorous, crying, was w/d/s/s with APGARS of 7/8. Nuchal x1. Void x1. Resuscitation included: CPAP 5L 30% at highest setting from 3MOL to 7.5MOL. Then deep suctioned x1. Successfully transitioned to room air. Mom plans to initiate breastfeeding and formula feed, consents Hep B vaccine.  Highest maternal temp: 37.3. EOS 0.37.

## 2022-06-04 NOTE — OB PROVIDER LABOR PROGRESS NOTE - ASSESSMENT
IOL for gHTN, now with CB  - Continue vaginal cytotec    EMANI Miles PGY4  
- vaginal cytotec placed without difficulty     Latosha Patterson, PGY1  
- Plan for pt to shower   - re-examine after shower  - place cervical balloon if able   - place vaginal cytotec at that time     Latosha Patterson, PGY1  d/w Dr. Mendez 
- c/w pitocin  - practice push attempted with minimal descent of head     Latosha Patterson, PGY1  d/w Dr. Carmichael 
A/P:   32y  @ 37.2wga admitted for IOL 2/2 to [facility]    #Labor   - s/p PO  - Vaginal Misoprostol #2 placed. Cervix not amendable for CRB at this time     #Fetal Wellbeing   - Cat 1    # Issues   - Most recent /69    #Pain Control   - Epidural, IV, and/or PO PRN    Carlos Gibson, PGY-1    Plan per Dr. Chong 
#Labor   - s/p PO, CRB, and vaginal Misoprostol  - Will c/w Oxytocin. Pt w/ IUPC and ISE    #Fetal Wellbeing   - Cat 1    # Issues   - Most recent /62. c/w Mg for siPEC w/ severe features     #Pain Control   -  w/ Epi     Carlos Gibson, PGY-1    d/w Dr. Carmichael 
- c/w pitocin   - Anesthesia called for top off       Latosha Patterson, PGY1  d/w Dr. Carmichael 
31yo  @37+1 IOL for gHTN    - SVE: 0/0/-3  - Unable to place CB at this time, c/w po cytotec  - FHT Cat 1, reassuring, ctm    d/w Dr. Arlette Velez, PGY1
33yo  @37+3 IOL for gHTN    - SVE: /-2  - s/p CB, po and VC  - for pitocin  - FHT Cat 1, reassuring, ctm    d/w Dr. Jany Velez, PGY1
- vaginal cytotec #1 placed without difficulty   - Discussed possibility of early epidural to allow for CB placement. Pt unable to tolerate exams well.   - c/w vaginal cytotec     Latosha Patterson, PGY1  plan per Dr. Mendez 
31yo  @37+2 IOL for gHTN    - SVE; 0.5/50/-3  - FHT Cat 1, reassuring, ctm  - VE before next vaginal cytotec and if still <2cm will get epidural and then CB and VC  - f/u HELLP labs  - Expect     d/w Dr. Arlette Velez, PGY1
- vaginal cytotec placed without difficulty   - cervical balloon still in place     Latosha Patterson, PGY1
31yo  @37+2 IOL for gHTN    - SVE: /-3  - FHT Cat 1, reassuring, ctm  - CB attempt unsuccessful  - s/p Stadol, patient considering epidural    d/w Dr. Arlette Velez, PGY1
33yo  @37+3 IOL for gHTN    - SVE: /-3  - AROM@12p, clear  - IUPC placed  - c/w pitocin    d/w Dr. Jany Velez, PGY1

## 2022-06-05 LAB
BASOPHILS # BLD AUTO: 0.02 K/UL — SIGNIFICANT CHANGE UP (ref 0–0.2)
BASOPHILS NFR BLD AUTO: 0.1 % — SIGNIFICANT CHANGE UP (ref 0–2)
EOSINOPHIL # BLD AUTO: 0.25 K/UL — SIGNIFICANT CHANGE UP (ref 0–0.5)
EOSINOPHIL NFR BLD AUTO: 1.7 % — SIGNIFICANT CHANGE UP (ref 0–6)
HCT VFR BLD CALC: 27.7 % — LOW (ref 34.5–45)
HGB BLD-MCNC: 9.3 G/DL — LOW (ref 11.5–15.5)
IANC: 12.26 K/UL — HIGH (ref 1.8–7.4)
IMM GRANULOCYTES NFR BLD AUTO: 0.6 % — SIGNIFICANT CHANGE UP (ref 0–1.5)
KLEIHAUER-BETKE CALCULATION: 0.01 % — SIGNIFICANT CHANGE UP
LYMPHOCYTES # BLD AUTO: 1.6 K/UL — SIGNIFICANT CHANGE UP (ref 1–3.3)
LYMPHOCYTES # BLD AUTO: 10.6 % — LOW (ref 13–44)
MCHC RBC-ENTMCNC: 30 PG — SIGNIFICANT CHANGE UP (ref 27–34)
MCHC RBC-ENTMCNC: 33.6 GM/DL — SIGNIFICANT CHANGE UP (ref 32–36)
MCV RBC AUTO: 89.4 FL — SIGNIFICANT CHANGE UP (ref 80–100)
MONOCYTES # BLD AUTO: 0.9 K/UL — SIGNIFICANT CHANGE UP (ref 0–0.9)
MONOCYTES NFR BLD AUTO: 6 % — SIGNIFICANT CHANGE UP (ref 2–14)
NEUTROPHILS # BLD AUTO: 12.26 K/UL — HIGH (ref 1.8–7.4)
NEUTROPHILS NFR BLD AUTO: 81 % — HIGH (ref 43–77)
NRBC # BLD: 0 /100 WBCS — SIGNIFICANT CHANGE UP
NRBC # FLD: 0 K/UL — SIGNIFICANT CHANGE UP
PLATELET # BLD AUTO: 205 K/UL — SIGNIFICANT CHANGE UP (ref 150–400)
RBC # BLD: 3.1 M/UL — LOW (ref 3.8–5.2)
RBC # FLD: 13.4 % — SIGNIFICANT CHANGE UP (ref 10.3–14.5)
WBC # BLD: 15.12 K/UL — HIGH (ref 3.8–10.5)
WBC # FLD AUTO: 15.12 K/UL — HIGH (ref 3.8–10.5)

## 2022-06-05 RX ORDER — IBUPROFEN 200 MG
600 TABLET ORAL EVERY 6 HOURS
Refills: 0 | Status: DISCONTINUED | OUTPATIENT
Start: 2022-06-05 | End: 2022-06-06

## 2022-06-05 RX ADMIN — Medication 600 MILLIGRAM(S): at 10:15

## 2022-06-05 RX ADMIN — Medication 975 MILLIGRAM(S): at 12:24

## 2022-06-05 RX ADMIN — Medication 975 MILLIGRAM(S): at 00:26

## 2022-06-05 RX ADMIN — Medication 975 MILLIGRAM(S): at 13:04

## 2022-06-05 RX ADMIN — Medication 975 MILLIGRAM(S): at 07:00

## 2022-06-05 RX ADMIN — Medication 600 MILLIGRAM(S): at 10:30

## 2022-06-05 RX ADMIN — SIMETHICONE 80 MILLIGRAM(S): 80 TABLET, CHEWABLE ORAL at 10:15

## 2022-06-05 RX ADMIN — Medication 975 MILLIGRAM(S): at 06:30

## 2022-06-05 RX ADMIN — SIMETHICONE 80 MILLIGRAM(S): 80 TABLET, CHEWABLE ORAL at 17:31

## 2022-06-05 RX ADMIN — Medication 600 MILLIGRAM(S): at 17:30

## 2022-06-05 RX ADMIN — HEPARIN SODIUM 10000 UNIT(S): 5000 INJECTION INTRAVENOUS; SUBCUTANEOUS at 12:25

## 2022-06-05 RX ADMIN — Medication 600 MILLIGRAM(S): at 23:48

## 2022-06-05 RX ADMIN — Medication 975 MILLIGRAM(S): at 00:56

## 2022-06-05 RX ADMIN — HEPARIN SODIUM 10000 UNIT(S): 5000 INJECTION INTRAVENOUS; SUBCUTANEOUS at 00:27

## 2022-06-05 RX ADMIN — HEPARIN SODIUM 10000 UNIT(S): 5000 INJECTION INTRAVENOUS; SUBCUTANEOUS at 23:48

## 2022-06-05 RX ADMIN — Medication 600 MILLIGRAM(S): at 16:25

## 2022-06-05 NOTE — PROGRESS NOTE ADULT - ASSESSMENT
A/P: 33yo POD#1 s/p LTCS for maternal exhaustion c/b gHTN.  Patient is stable and doing well post-operatively.    #gHTN  - continue to monitor BPs and for any s/s sPEC  - no standing antihypertensives at this time  - Continue regular diet.  - Increase ambulation.  - Continue motrin, tylenol, oxycodone PRN for pain control.      Ifrah Velázquez, PGY-1

## 2022-06-05 NOTE — LACTATION INITIAL EVALUATION - LACTATION INTERVENTIONS
initiate/review safe skin-to-skin/initiate/review hand expression/initiate/review techniques for position and latch/review techniques to increase milk supply/initiate/review finger suck/initiate/review breast massage/compression/reviewed components of an effective feeding and at least 8 effective feedings per day required/reviewed importance of monitoring infant diapers, the breastfeeding log, and minimum output each day/reviewed risks of unnecessary formula supplementation/reviewed strategies to transition to breastfeeding only/reviewed benefits and recommendations for rooming in/reviewed feeding on demand/by cue at least 8 times a day/reviewed indications of inadequate milk transfer that would require supplementation

## 2022-06-05 NOTE — LACTATION INITIAL EVALUATION - BREAST SURGERY
Anesthesia Evaluation     Patient summary reviewed   no history of anesthetic complications:  NPO Solid Status: > 8 hours  NPO Liquid Status: > 8 hours           Airway   Mallampati: II  TM distance: >3 FB  Neck ROM: full  Dental          Pulmonary    (-) asthma, recent URI, sleep apnea, not a smoker  Cardiovascular   Exercise tolerance: good (4-7 METS)    ECG reviewed  Patient on routine beta blocker and Beta blocker given within 24 hours of surgery    (+) hypertension, hyperlipidemia,   (-) pacemaker, past MI, angina, cardiac stents, CABG      Neuro/Psych  (-) seizures, TIA, CVA  GI/Hepatic/Renal/Endo    (+)  GERD,    (-) liver disease, no renal disease, diabetes, no thyroid disorder    Musculoskeletal     (+) back pain,   Abdominal    Substance History      OB/GYN          Other      history of cancer (Endometrial 2010, hysterectomy) active                    Anesthesia Plan    ASA 3     general     intravenous induction     Anesthetic plan, all risks, benefits, and alternatives have been provided, discussed and informed consent has been obtained with: patient.  Use of blood products discussed with patient  Consented to blood products.     
no

## 2022-06-05 NOTE — LACTATION INITIAL EVALUATION - POTENTIAL FOR
Fever x this morning. Pt. is alert and appropriate.  No pmhx.
ineffective breastfeeding/knowledge deficit/latch on difficulty

## 2022-06-06 VITALS
DIASTOLIC BLOOD PRESSURE: 81 MMHG | HEART RATE: 101 BPM | OXYGEN SATURATION: 99 % | SYSTOLIC BLOOD PRESSURE: 142 MMHG | TEMPERATURE: 98 F | RESPIRATION RATE: 17 BRPM

## 2022-06-06 RX ADMIN — Medication 975 MILLIGRAM(S): at 04:02

## 2022-06-06 RX ADMIN — Medication 975 MILLIGRAM(S): at 03:05

## 2022-06-06 RX ADMIN — Medication 600 MILLIGRAM(S): at 00:27

## 2022-06-06 RX ADMIN — Medication 600 MILLIGRAM(S): at 06:22

## 2022-06-06 RX ADMIN — Medication 600 MILLIGRAM(S): at 05:46

## 2022-06-06 NOTE — PROGRESS NOTE ADULT - SUBJECTIVE AND OBJECTIVE BOX
POST OP DAY  2  s/p   SECTION    SUBJECTIVE:  I'm ok.    PAIN SCALE SCORE: [x] Refer to charted pain scores    THERAPY:  [ x ] Spinal morphine   [  ] Epidural morphine   [  ] IV PCA Hydromorphone 1 mg/ml    OBJECTIVE:  Comfortable Appearing    SEDATION SCORE:	  [ x ] Alert	    [  ] Drowsy        [  ] Arousable	[  ] Asleep	[  ] Unresponsive    Side Effects:	  [ x ] None	     [  ] Nausea        [  ] Pruritus        [  ] Weakness   [  ] Numbness        ASSESSMENT/ PLAN   [   ] Discontinue         [  ] Continue    [ x ] Change to prn Analgesics as per primary service.    DOCUMENTATION & VERIFICATION OF CURRENT MEDS [ x ] Done    COMMENTS: No Headache.  
ANESTHESIA POSTOP CHECK    32y Female POSTOP DAY 2 s/p .    No COMPLAINTS    NO APPARENT ANESTHESIA COMPLICATIONS      
patient seen today   POD 2 after Cs  doing well at this time  labs and vitals reviewed, stable.  Patient has 1 week follow up for HTN checkup   patient also with psychiatrist follow up later this week for history of depression as well  otherwise well at present  stable for discharge
  NP provider note:    Patient seen at bedside resting comfortably offers no new complaints. + Ambulation, + void without difficulty, + flatus;  no bm; tolerating regular diet. both breastfeeding and bottle feeding. Bonding well w .  Denies HA, blurry vision or epigastric pain, CP, SOB, N/V/D,  dizziness, palpitations, worsening vaginal bleeding.    Vital Signs Last 24 Hrs  T(C): 37 (2022 06:00), Max: 37 (2022 06:00)  T(F): 98.6 (2022 06:00), Max: 98.6 (2022 06:00)  HR: 97 (2022 06:00) (90 - 103)  BP: 111/66 (2022 06:00) (111/66 - 136/79)  BP(mean): --  RR: 18 (2022 06:00) (18 - 19)  SpO2: 98% (2022 06:00) (98% - 100%)    Gen: A&O x 3, NAD  Chest: CTABL  Cardiac: S1, S2, RRR  Breast: Soft, nontender, nonengorged  Abdomen: +BS; soft; Nontender, nondistended, Incision C/D/I   Gyn: Minimal lochia  Extremities: Nontender, DTRS 2+, no worsening edema                          9.3    15.12 )-----------( 205      ( 2022 05:45 )             27.7           Assessment and Plan:   · Assessment	  A/P: 33yo POD#1 s/p LTCS for maternal exhaustion c/b gHTN.  Patient is stable and doing well post-operatively.    #gHTN  - continue to monitor BPs and for any s/s sPEC  - no standing antihypertensives at this time  - Continue regular diet.  - cont PNV, iron daily  - BP checks at home TID, call for BP >140/90  - PEC prec reviewed   - Increase ambulation.  - Continue motrin, tylenol, oxycodone PRN for pain control  - Post partum fu in 1wk for BP check    dw Dr Rosendo Landaverde AGNP-c   [phone]
OB Progress Note:  Delivery, POD#1    S: 31yo POD#1 s/p LTCS for maternal exhaustion c/b gHTN. Her pain is well controlled. She is tolerating a regular diet and not yet passing flatus. She is ambulating without difficulty and voiding spontaneously. Denies CP/SOB, lightheadedness/dizziness, N/V. Denies HA, changes in vision, RUQ pain, palpitations, increased LE edema.    O:   Vital Signs Last 24 Hrs  T(C): 36.2 (2022 06:00), Max: 36.7 (2022 14:32)  T(F): 97.1 (2022 06:00), Max: 98.1 (2022 14:32)  HR: 73 (2022 06:00) (73 - 113)  BP: 110/61 (2022 06:00) (103/65 - 130/77)  BP(mean): 72 (2022 13:30) (72 - 95)  RR: 18 (2022 06:00) (17 - 25)  SpO2: 99% (2022 06:00) (97% - 99%)    Labs:  Blood type: O Negative  Rubella IgG: RPR: Negative                          9.3<L>   15.12<H> >-----------< 205    (  @ 05:45 )             27.7<L>                        11.1<L>   21.92<H> >-----------< 261    (  @ 08:08 )             34.4<L>                        12.4   12.11<H> >-----------< 285    (  @ 11:14 )             37.1    22 @ 11:14      135  |  101  |  7   ----------------------------<  81  4.0   |  21<L>  |  0.58        Ca    9.5      2022 11:14    TPro  6.7  /  Alb  3.6  /  TBili  0.3  /  DBili  x   /  AST  23  /  ALT  22  /  AlkPhos  123<H>  22 @ 11:14          PE:  General: NAD  Abdomen: Mildly distended, appropriately tender, incision c/d/i.  GYN: minimal lochia on pad  Extremities: No erythema, no pitting edema

## 2022-06-07 ENCOUNTER — NON-APPOINTMENT (OUTPATIENT)
Age: 32
End: 2022-06-07

## 2022-06-07 DIAGNOSIS — F41.9 ANXIETY DISORDER, UNSPECIFIED: ICD-10-CM

## 2022-06-07 RX ORDER — IBUPROFEN 600 MG/1
600 TABLET, FILM COATED ORAL EVERY 6 HOURS
Refills: 0 | Status: ACTIVE | COMMUNITY
Start: 2022-06-07

## 2022-06-07 RX ORDER — PNV NO.95/FERROUS FUM/FOLIC AC 28MG-0.8MG
28-0.8 TABLET ORAL DAILY
Refills: 0 | Status: ACTIVE | COMMUNITY
Start: 2022-06-07

## 2022-06-07 RX ORDER — ACETAMINOPHEN 500 MG/1
500 TABLET ORAL
Refills: 0 | Status: ACTIVE | COMMUNITY
Start: 2022-06-07

## 2022-06-08 ENCOUNTER — TRANSCRIPTION ENCOUNTER (OUTPATIENT)
Age: 32
End: 2022-06-08

## 2022-06-08 PROBLEM — U07.1 COVID-19: Chronic | Status: ACTIVE | Noted: 2022-05-31

## 2022-06-09 ENCOUNTER — NON-APPOINTMENT (OUTPATIENT)
Age: 32
End: 2022-06-09

## 2022-06-13 ENCOUNTER — NON-APPOINTMENT (OUTPATIENT)
Age: 32
End: 2022-06-13

## 2022-06-15 ENCOUNTER — TRANSCRIPTION ENCOUNTER (OUTPATIENT)
Age: 32
End: 2022-06-15

## 2022-06-20 ENCOUNTER — NON-APPOINTMENT (OUTPATIENT)
Age: 32
End: 2022-06-20

## 2022-06-27 ENCOUNTER — NON-APPOINTMENT (OUTPATIENT)
Age: 32
End: 2022-06-27

## 2022-06-29 DIAGNOSIS — D64.9 ANEMIA, UNSPECIFIED: ICD-10-CM

## 2022-06-29 DIAGNOSIS — Z78.9 OTHER SPECIFIED HEALTH STATUS: ICD-10-CM

## 2022-06-29 DIAGNOSIS — Z86.79 PERSONAL HISTORY OF OTHER DISEASES OF THE CIRCULATORY SYSTEM: ICD-10-CM

## 2022-06-30 ENCOUNTER — APPOINTMENT (OUTPATIENT)
Dept: CARDIOLOGY | Facility: CLINIC | Age: 32
End: 2022-06-30

## 2022-06-30 ENCOUNTER — NON-APPOINTMENT (OUTPATIENT)
Age: 32
End: 2022-06-30

## 2022-06-30 VITALS
DIASTOLIC BLOOD PRESSURE: 90 MMHG | HEIGHT: 67 IN | SYSTOLIC BLOOD PRESSURE: 122 MMHG | OXYGEN SATURATION: 97 % | WEIGHT: 263 LBS | TEMPERATURE: 98.4 F | HEART RATE: 117 BPM | BODY MASS INDEX: 41.28 KG/M2

## 2022-06-30 DIAGNOSIS — O13.9 GESTATIONAL [PREGNANCY-INDUCED] HYPERTENSION W/OUT SIGNIFICANT PROTEINURIA, UNSPECIFIED TRIMESTER: ICD-10-CM

## 2022-06-30 DIAGNOSIS — I10 ESSENTIAL (PRIMARY) HYPERTENSION: ICD-10-CM

## 2022-06-30 PROCEDURE — 99203 OFFICE O/P NEW LOW 30 MIN: CPT

## 2022-06-30 PROCEDURE — 93000 ELECTROCARDIOGRAM COMPLETE: CPT

## 2022-06-30 NOTE — DISCUSSION/SUMMARY
[FreeTextEntry1] : 32 year old woman  with gestational HTN now with normal BP (sometimes slightly elevate that she relates to anxiety)\par Will continue to monitor at home\par Will call me with higher readings if happen\par Diet and low sodium discussed\par FU in 2 months [EKG obtained to assist in diagnosis and management of assessed problem(s)] : EKG obtained to assist in diagnosis and management of assessed problem(s)

## 2022-06-30 NOTE — HISTORY OF PRESENT ILLNESS
[FreeTextEntry1] : 32 year old woman  who delivered 3 weeks ago at 37 weeks with pregnancy complicated by gestational hypertension. She never had a diagnosis of HTN in the past. Her bp has normalized at home. She has no complaints\par EKG normal

## 2022-07-05 ENCOUNTER — NON-APPOINTMENT (OUTPATIENT)
Age: 32
End: 2022-07-05

## 2022-07-06 ENCOUNTER — NON-APPOINTMENT (OUTPATIENT)
Age: 32
End: 2022-07-06

## 2022-08-25 ENCOUNTER — APPOINTMENT (OUTPATIENT)
Dept: CARDIOLOGY | Facility: CLINIC | Age: 32
End: 2022-08-25

## 2023-08-02 NOTE — OB RN INTRAOPERATIVE NOTE - NSRNPREP_OBGYN_ALL_OB
Yes Soolantra Counseling: I discussed with the patients the risks of topial Soolantra. This is a medicine which decreases the number of mites and inflammation in the skin. You experience burning, stinging, eye irritation or allergic reactions.  Please call our office if you develop any problems from using this medication.

## 2024-01-08 NOTE — OB PROVIDER IHI INDUCTION/AUGMENTATION NOTE - NS_EFFACEMANT_OBGYN_ALL_OB_NU
Caller: Ricardo Seay    Relationship to patient: Self    Best call back number:  [phone]         Type of visit: COLONOSCOPY         If rescheduling, when is the original appointment: 02/09/2024     Additional notes:PATIENT IS HAVING COLONOSCOPY IN [place] PLEASE CANCEL            
0
60

## 2024-10-10 NOTE — OB PROVIDER TRIAGE NOTE - NS_FHRACCEL_OBGYN_ALL_OB
Current patient location:  [place], [place]    Is the patient currently in the state of [place]? YES    Visit mode:VIDEO    If the visit is dropped, the patient can be reconnected by: VIDEO VISIT: Text to cell phone:   Telephone Information:   Mobile [phone]   Mobile [phone]   Mobile [phone]       Will anyone else be joining the visit? NO  (If patient encounters technical issues they should call [phone]    How would you like to obtain your AVS? MyChart    Are changes needed to the allergy or medication list? Pt stated no changes to allergies and Pt stated no med changes    Are refills needed on medications prescribed by this physician? NO    Rooming Documentation:  Questionnaire(s) completed    Reason for visit: RECHECK     Ree BONILLA   Present (15 x15 bpm) Statement Selected

## 2025-06-12 ENCOUNTER — APPOINTMENT (OUTPATIENT)
Facility: CLINIC | Age: 35
End: 2025-06-12
Payer: COMMERCIAL

## 2025-06-12 ENCOUNTER — ASOB RESULT (OUTPATIENT)
Age: 35
End: 2025-06-12

## 2025-06-12 PROCEDURE — 76805 OB US >/= 14 WKS SNGL FETUS: CPT

## 2025-07-08 ENCOUNTER — APPOINTMENT (OUTPATIENT)
Facility: CLINIC | Age: 35
End: 2025-07-08

## 2025-07-08 ENCOUNTER — ASOB RESULT (OUTPATIENT)
Age: 35
End: 2025-07-08

## 2025-07-08 ENCOUNTER — APPOINTMENT (OUTPATIENT)
Facility: CLINIC | Age: 35
End: 2025-07-08
Payer: COMMERCIAL

## 2025-07-08 PROCEDURE — 99205 OFFICE O/P NEW HI 60 MIN: CPT

## 2025-07-08 PROCEDURE — 76811 OB US DETAILED SNGL FETUS: CPT

## 2025-07-15 RX ORDER — PNV/FERROUS SULFATE/FOLIC ACID 27-<0.5MG
TABLET ORAL
Refills: 0 | Status: ACTIVE | COMMUNITY

## 2025-07-15 RX ORDER — ASPIRIN 81 MG/1
81 TABLET, CHEWABLE ORAL
Refills: 0 | Status: ACTIVE | COMMUNITY

## 2025-07-15 RX ORDER — CHROMIUM 200 MCG
TABLET ORAL
Refills: 0 | Status: ACTIVE | COMMUNITY

## 2025-07-15 RX ORDER — DOXYLAMINE SUCCINATE AND PYRIDOXINE HYDROCHLORIDE 10; 10 MG/1; MG/1
10-10 TABLET, DELAYED RELEASE ORAL
Refills: 0 | Status: ACTIVE | COMMUNITY